# Patient Record
(demographics unavailable — no encounter records)

---

## 2025-01-08 NOTE — HISTORY OF PRESENT ILLNESS
[de-identified] : ophto referral for eye pain left eye looking rightward causes pain.  left hand pinky pain radiating up the forearm . no parashtesia reported. Saw Cardio this summer to followup on "leaky heart valve"